# Patient Record
Sex: FEMALE | Race: WHITE | NOT HISPANIC OR LATINO | URBAN - METROPOLITAN AREA
[De-identification: names, ages, dates, MRNs, and addresses within clinical notes are randomized per-mention and may not be internally consistent; named-entity substitution may affect disease eponyms.]

---

## 2023-03-28 ENCOUNTER — TELEPHONE ENCOUNTER (OUTPATIENT)
Dept: URBAN - METROPOLITAN AREA CLINIC 124 | Facility: CLINIC | Age: 58
End: 2023-03-28

## 2023-05-15 ENCOUNTER — LAB OUTSIDE AN ENCOUNTER (OUTPATIENT)
Dept: URBAN - METROPOLITAN AREA CLINIC 96 | Facility: CLINIC | Age: 58
End: 2023-05-15

## 2023-05-15 ENCOUNTER — OFFICE VISIT (OUTPATIENT)
Dept: URBAN - METROPOLITAN AREA CLINIC 96 | Facility: CLINIC | Age: 58
End: 2023-05-15
Payer: COMMERCIAL

## 2023-05-15 VITALS
HEIGHT: 63 IN | TEMPERATURE: 97.7 F | WEIGHT: 164 LBS | BODY MASS INDEX: 29.06 KG/M2 | DIASTOLIC BLOOD PRESSURE: 83 MMHG | SYSTOLIC BLOOD PRESSURE: 132 MMHG

## 2023-05-15 DIAGNOSIS — Z86.010 PERSONAL HISTORY OF COLONIC POLYPS: ICD-10-CM

## 2023-05-15 DIAGNOSIS — K21.00 GASTROESOPHAGEAL REFLUX DISEASE WITH ESOPHAGITIS WITHOUT HEMORRHAGE: ICD-10-CM

## 2023-05-15 DIAGNOSIS — D50.8 OTHER IRON DEFICIENCY ANEMIA: ICD-10-CM

## 2023-05-15 PROBLEM — 87522002: Status: ACTIVE | Noted: 2023-05-15

## 2023-05-15 PROBLEM — 266433003: Status: ACTIVE | Noted: 2023-05-15

## 2023-05-15 PROBLEM — 428283002: Status: ACTIVE | Noted: 2023-05-15

## 2023-05-15 PROCEDURE — 99204 OFFICE O/P NEW MOD 45 MIN: CPT | Performed by: INTERNAL MEDICINE

## 2023-05-15 RX ORDER — PANTOPRAZOLE SODIUM 40 MG/1
1 TABLET TABLET, DELAYED RELEASE ORAL ONCE A DAY
Qty: 90 TABLET | Refills: 3 | OUTPATIENT
Start: 2023-05-15

## 2023-05-15 RX ORDER — SODIUM, POTASSIUM,MAG SULFATES 17.5-3.13G
354 ML SOLUTION, RECONSTITUTED, ORAL ORAL AS DIRECTED
Qty: 1 | Refills: 0 | OUTPATIENT
Start: 2023-05-15 | End: 2023-05-16

## 2023-05-15 NOTE — HPI-TODAY'S VISIT:
Pt had egd and colon in 2/2020 -- 2 polyps in colon (tubular adenom and hyperplastic polyp), irreg z line but no quinn's on path - [facility]  recommended to have egd done next time in hospital secondary to increased secretions no abd pain no nausea or emesis no fevers or chills normal bowel habit is 1 BM q1-2days no BRBPR or melena she has heartburn and indigestion "All the time" she is on Omeprazole 40mg a day - if she misses it she is horrible and sometimes has to take a tums on top of it she takes it in am and symptoms worse at night/bedtime - keeps tums by her bed

## 2023-05-16 ENCOUNTER — OFFICE VISIT (OUTPATIENT)
Dept: URBAN - METROPOLITAN AREA CLINIC 96 | Facility: CLINIC | Age: 58
End: 2023-05-16

## 2023-06-22 ENCOUNTER — OFFICE VISIT (OUTPATIENT)
Dept: URBAN - METROPOLITAN AREA MEDICAL CENTER 28 | Facility: MEDICAL CENTER | Age: 58
End: 2023-06-22
Payer: COMMERCIAL

## 2023-06-22 DIAGNOSIS — Z86.010 ADENOMAS PERSONAL HISTORY OF COLONIC POLYPS: ICD-10-CM

## 2023-06-22 DIAGNOSIS — K31.89 ACQUIRED DEFORMITY OF DUODENUM: ICD-10-CM

## 2023-06-22 DIAGNOSIS — K29.60 ADENOPAPILLOMATOSIS GASTRICA: ICD-10-CM

## 2023-06-22 DIAGNOSIS — K20.80 ESOPHAGITIS DISSECANS SUPERFICIALIS: ICD-10-CM

## 2023-06-22 DIAGNOSIS — Z09 CARDIOLOGY FOLLOW-UP ENCOUNTER: ICD-10-CM

## 2023-06-22 PROCEDURE — 45378 DIAGNOSTIC COLONOSCOPY: CPT | Performed by: INTERNAL MEDICINE

## 2023-06-22 PROCEDURE — 43239 EGD BIOPSY SINGLE/MULTIPLE: CPT | Performed by: INTERNAL MEDICINE

## 2023-06-22 RX ORDER — PANTOPRAZOLE SODIUM 40 MG/1
1 TABLET TABLET, DELAYED RELEASE ORAL ONCE A DAY
Qty: 90 TABLET | Refills: 3 | Status: ACTIVE | COMMUNITY
Start: 2023-05-15

## 2023-07-11 ENCOUNTER — LAB OUTSIDE AN ENCOUNTER (OUTPATIENT)
Dept: URBAN - METROPOLITAN AREA TELEHEALTH 2 | Facility: TELEHEALTH | Age: 58
End: 2023-07-11

## 2023-07-11 ENCOUNTER — OFFICE VISIT (OUTPATIENT)
Dept: URBAN - METROPOLITAN AREA TELEHEALTH 2 | Facility: TELEHEALTH | Age: 58
End: 2023-07-11
Payer: COMMERCIAL

## 2023-07-11 VITALS — HEIGHT: 63 IN | WEIGHT: 160 LBS | BODY MASS INDEX: 28.35 KG/M2

## 2023-07-11 DIAGNOSIS — Z86.010 PERSONAL HISTORY OF COLONIC POLYPS: ICD-10-CM

## 2023-07-11 DIAGNOSIS — R12 HEARTBURN: ICD-10-CM

## 2023-07-11 DIAGNOSIS — K21.00 GASTROESOPHAGEAL REFLUX DISEASE WITH ESOPHAGITIS WITHOUT HEMORRHAGE: ICD-10-CM

## 2023-07-11 DIAGNOSIS — D50.9 ANEMIA: ICD-10-CM

## 2023-07-11 PROCEDURE — 99214 OFFICE O/P EST MOD 30 MIN: CPT | Performed by: INTERNAL MEDICINE

## 2023-07-11 PROCEDURE — 99204 OFFICE O/P NEW MOD 45 MIN: CPT | Performed by: INTERNAL MEDICINE

## 2023-07-11 RX ORDER — PANTOPRAZOLE SODIUM 40 MG/1
1 TABLET TABLET, DELAYED RELEASE ORAL ONCE A DAY
Qty: 90 TABLET | Refills: 3 | Status: ON HOLD | COMMUNITY
Start: 2023-05-15

## 2023-07-11 RX ORDER — PANTOPRAZOLE SODIUM 40 MG/1
1 TABLET TABLET, DELAYED RELEASE ORAL ONCE A DAY
Qty: 90 TABLET | Refills: 3 | OUTPATIENT

## 2023-07-11 RX ORDER — OMEPRAZOLE 40 MG/1
1 CAPSULE 30 MINUTES BEFORE MORNING MEAL CAPSULE, DELAYED RELEASE ORAL ONCE A DAY
Status: ACTIVE | COMMUNITY

## 2023-07-11 NOTE — HPI-TODAY'S VISIT:
56 yo fem ref for a GI fu for anemia and a copy will be sent to the ref provider. Pt had egd and colon in 2/2020 -- 2 polyps in colon (tubular adenom and hyperplastic polyp), irreg z line but no quinn's on path - [facility]  recommended to have egd done next time in hospital secondary to increased secretions no abd pain no nausea or emesis no fevers or chills normal bowel habit is 1 BM q1-2days no BRBPR or melena she has heartburn and indigestion "All the time" she is on Omeprazole 40mg a day - if she misses it she is horrible and sometimes has to take a tums on top of it she takes it in am and symptoms worse at night/bedtime - keeps tums by her bed. Patient had both EGD and colonoscopy done on June 22, 2023 at [facility] GI lab.  Colonoscopy showed internal hemorrhoids otherwise normal exam.  Upper endoscopy showed grade a esophagitis, medium size hiatal hernia otherwise normal exam.  Biopsies were sent.  Pathology showed no evidence of celiac, some inflammation antrum but no H. pylori, and normal body and distal esophagus with focal acute and chronic inflammation but no Quinn's. PT is doing fine. She went back on omeprazole 40mg felt, panto was not working. She is doing ok overall. No dysphagia and no regurgitation. Lost a few pounds on pupose. Sister with her- mom has AML so they are at [facility].

## 2023-07-12 ENCOUNTER — TELEPHONE ENCOUNTER (OUTPATIENT)
Dept: URBAN - METROPOLITAN AREA CLINIC 92 | Facility: CLINIC | Age: 58
End: 2023-07-12

## 2023-08-01 ENCOUNTER — OFFICE VISIT (OUTPATIENT)
Dept: URBAN - METROPOLITAN AREA CLINIC 95 | Facility: CLINIC | Age: 58
End: 2023-08-01

## 2023-08-24 ENCOUNTER — OFFICE VISIT (OUTPATIENT)
Dept: URBAN - METROPOLITAN AREA CLINIC 92 | Facility: CLINIC | Age: 58
End: 2023-08-24

## 2023-10-01 ENCOUNTER — WEB ENCOUNTER (OUTPATIENT)
Dept: URBAN - METROPOLITAN AREA CLINIC 96 | Facility: CLINIC | Age: 58
End: 2023-10-01

## 2023-10-26 ENCOUNTER — OFFICE VISIT (OUTPATIENT)
Dept: URBAN - METROPOLITAN AREA CLINIC 95 | Facility: CLINIC | Age: 58
End: 2023-10-26
Payer: COMMERCIAL

## 2023-10-26 DIAGNOSIS — D50.9 ANEMIA: ICD-10-CM

## 2023-10-26 PROCEDURE — 91110 GI TRC IMG INTRAL ESOPH-ILE: CPT | Performed by: INTERNAL MEDICINE

## 2023-11-03 ENCOUNTER — TELEPHONE ENCOUNTER (OUTPATIENT)
Dept: URBAN - METROPOLITAN AREA CLINIC 96 | Facility: CLINIC | Age: 58
End: 2023-11-03

## 2023-11-28 ENCOUNTER — TELEPHONE ENCOUNTER (OUTPATIENT)
Dept: URBAN - METROPOLITAN AREA CLINIC 96 | Facility: CLINIC | Age: 58
End: 2023-11-28

## 2023-12-06 ENCOUNTER — OFFICE VISIT (OUTPATIENT)
Dept: URBAN - METROPOLITAN AREA CLINIC 96 | Facility: CLINIC | Age: 58
End: 2023-12-06

## 2023-12-21 ENCOUNTER — CLAIMS CREATED FROM THE CLAIM WINDOW (OUTPATIENT)
Dept: URBAN - METROPOLITAN AREA TELEHEALTH 2 | Facility: TELEHEALTH | Age: 58
End: 2023-12-21
Payer: COMMERCIAL

## 2023-12-21 ENCOUNTER — DASHBOARD ENCOUNTERS (OUTPATIENT)
Age: 58
End: 2023-12-21

## 2023-12-21 VITALS — HEIGHT: 63 IN | WEIGHT: 160 LBS | BODY MASS INDEX: 28.35 KG/M2

## 2023-12-21 DIAGNOSIS — K20.90 ESOPHAGITIS: ICD-10-CM

## 2023-12-21 DIAGNOSIS — K21.00 GASTROESOPHAGEAL REFLUX DISEASE WITH ESOPHAGITIS WITHOUT HEMORRHAGE: ICD-10-CM

## 2023-12-21 DIAGNOSIS — K44.9 HIATAL HERNIA: ICD-10-CM

## 2023-12-21 DIAGNOSIS — D50.8 ACHLORHYDRIC ANEMIA: ICD-10-CM

## 2023-12-21 DIAGNOSIS — Z86.010 PERSONAL HISTORY OF COLONIC POLYPS: ICD-10-CM

## 2023-12-21 DIAGNOSIS — D50.9 IRON DEFICIENCY ANEMIA, UNSPECIFIED IRON DEFICIENCY ANEMIA TYPE: ICD-10-CM

## 2023-12-21 PROCEDURE — 99204 OFFICE O/P NEW MOD 45 MIN: CPT | Performed by: INTERNAL MEDICINE

## 2023-12-21 RX ORDER — OMEPRAZOLE 40 MG/1
1 CAPSULE 30 MINUTES BEFORE MORNING MEAL CAPSULE, DELAYED RELEASE ORAL ONCE A DAY
COMMUNITY

## 2023-12-21 RX ORDER — VONOPRAZAN FUMARATE 26.72 MG/1
AS DIRECTED TABLET ORAL ONCE A DAY
Qty: 90 TABLETS | Refills: 1 | OUTPATIENT
Start: 2023-12-21

## 2023-12-21 RX ORDER — PANTOPRAZOLE SODIUM 40 MG/1
1 TABLET TABLET, DELAYED RELEASE ORAL ONCE A DAY
Qty: 90 TABLET | Refills: 3 | COMMUNITY

## 2023-12-21 NOTE — HPI-TODAY'S VISIT:
59 yo fem ref for a GI fu for iron def anemia and a copy will be sent to the ref provider. Pt had egd and colon in 2/2020 -- 2 polyps in colon (tubular adenom and hyperplastic polyp), irreg z line but no quinn's on path - [facility] HH  recommended to have egd done next time in hospital secondary to increased secretions no abd pain no nausea or emesis no fevers or chills normal bowel habit is 1 BM q1-2days no BRBPR or melena she has heartburn and indigestion "All the time" she is on Omeprazole 40mg a day - if she misses it she is horrible and sometimes has to take a tums on top of it she takes it in am and symptoms worse at night/bedtime - keeps tums by her bed. Patient had both EGD and colonoscopy done on June 22, 2023 at [facility] GI lab.  Colonoscopy showed internal hemorrhoids otherwise normal exam.  Upper endoscopy showed grade a esophagitis, medium size hiatal hernia otherwise normal exam.  Biopsies were sent.  Pathology showed no evidence of celiac, some inflammation antrum but no H. pylori, and normal body and distal esophagus with focal acute and chronic inflammation but no Quinn's. PT was doing fine. She went back on omeprazole 40mg felt, panto was not working. She was doing ok overall. No dysphagia and no regurgitation.Lost a few pounds on pupose. PillCam study was done on October 26, 2023 and the small bowel.  And revealed no obvious cause of iron deficiency anemia.  Pt saw Dr Petersen of [facility]- no known causes of iron def anemia. Pt had an iron infusoin.  PT is on omeprazole which works for her and working on lifestyle modifications.           Pt has no bowel complaints.

## 2025-02-17 ENCOUNTER — WEB ENCOUNTER (OUTPATIENT)
Dept: URBAN - METROPOLITAN AREA CLINIC 98 | Facility: CLINIC | Age: 60
End: 2025-02-17

## 2025-05-14 ENCOUNTER — LAB OUTSIDE AN ENCOUNTER (OUTPATIENT)
Dept: URBAN - METROPOLITAN AREA CLINIC 96 | Facility: CLINIC | Age: 60
End: 2025-05-14

## 2025-05-14 ENCOUNTER — OFFICE VISIT (OUTPATIENT)
Dept: URBAN - METROPOLITAN AREA CLINIC 96 | Facility: CLINIC | Age: 60
End: 2025-05-14
Payer: COMMERCIAL

## 2025-05-14 DIAGNOSIS — K44.9 HIATAL HERNIA: ICD-10-CM

## 2025-05-14 DIAGNOSIS — K21.00 GASTROESOPHAGEAL REFLUX DISEASE WITH ESOPHAGITIS WITHOUT HEMORRHAGE: ICD-10-CM

## 2025-05-14 DIAGNOSIS — Z86.0101 PERSONAL HISTORY OF ADENOMATOUS AND SERRATED COLON POLYPS: ICD-10-CM

## 2025-05-14 PROCEDURE — 99204 OFFICE O/P NEW MOD 45 MIN: CPT | Performed by: INTERNAL MEDICINE

## 2025-05-14 PROCEDURE — 99214 OFFICE O/P EST MOD 30 MIN: CPT | Performed by: INTERNAL MEDICINE

## 2025-05-14 RX ORDER — PANTOPRAZOLE SODIUM 40 MG/1
1 TABLET TABLET, DELAYED RELEASE ORAL ONCE A DAY
Qty: 90 TABLET | Refills: 3 | Status: ON HOLD | COMMUNITY

## 2025-05-14 RX ORDER — OMEPRAZOLE 40 MG/1
1 CAPSULE 30 MINUTES BEFORE MORNING MEAL CAPSULE, DELAYED RELEASE ORAL ONCE A DAY
Status: ACTIVE | COMMUNITY

## 2025-05-14 RX ORDER — VONOPRAZAN FUMARATE 26.72 MG/1
AS DIRECTED TABLET ORAL ONCE A DAY
Qty: 90 TABLETS | Refills: 1 | Status: ON HOLD | COMMUNITY
Start: 2023-12-21

## 2025-05-14 RX ORDER — VONOPRAZAN FUMARATE 26.72 MG/1
1 TABLET TABLET ORAL ONCE A DAY
Qty: 90 TABLET | Refills: 3 | OUTPATIENT
Start: 2025-05-14 | End: 2026-05-09

## 2025-05-14 NOTE — HPI-TODAY'S VISIT:
60 yo fem ref by Dr Pereyra for a GI fu and a copy will be sent to the ref provider. Prior visit- Pt had egd and colon in 2/2020 -- 2 polyps in colon (tubular adenom and hyperplastic polyp), irreg z line but no quinn's on path - [facility] sized HH  recommended to have egd done next time in hospital secondary to increased secretions no abd pain no nausea or emesis no fevers or chills normal bowel habit is 1 BM q1-2days no BRBPR or melena she has heartburn and indigestion "All the time" she is on Omeprazole 40mg a day - if she misses it she is horrible and sometimes has to take a tums on top of it she takes it in am and symptoms worse at night/bedtime - keeps tums by her bed. Patient had both EGD and colonoscopy done on June 22, 2023 at [facility] GI lab.  Colonoscopy showed internal hemorrhoids otherwise normal exam.  Upper endoscopy showed grade a esophagitis, medium size hiatal hernia otherwise normal exam.  Biopsies were sent.  Pathology showed no evidence of celiac, some inflammation antrum but no H. pylori, and normal body and distal esophagus with focal acute and chronic inflammation but no Quinn's. PT was doing fine. She went back on omeprazole 40mg felt, panto was not working. She was doing ok overall. No dysphagia and no regurgitation.Lost a few pounds on pupose. PillCam study was done on October 26, 2023 and the small bowel.  And revealed no obvious cause of iron deficiency anemia.  Pt saw Dr Petersen [facility]- no known causes of iron def anemia. Pt had an iron infusoin.  PT is on omeprazole which works for her and working on lifestyle modifications.           Pt has no bowel complaints.  Today's visit- Pt was seen in 2023 for hx of iron def anemia and hx of gerd with HH. Pt had tried various PPIs and I ord voquezna. Pt had iron infusion-2 yrs ago. Pt has borderline anemia. Pt is not taking iron.  Pt is taking omeprazole 40mg bid to control her reflux. She has cp/heartburn and regurgitation. Pt did see a surgeon but she decided to hold off back then. 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

## 2025-05-21 ENCOUNTER — OFFICE VISIT (OUTPATIENT)
Dept: URBAN - METROPOLITAN AREA CLINIC 96 | Facility: CLINIC | Age: 60
End: 2025-05-21

## 2025-06-10 ENCOUNTER — WEB ENCOUNTER (OUTPATIENT)
Dept: URBAN - METROPOLITAN AREA CLINIC 96 | Facility: CLINIC | Age: 60
End: 2025-06-10

## 2025-06-11 ENCOUNTER — TELEPHONE ENCOUNTER (OUTPATIENT)
Dept: URBAN - METROPOLITAN AREA CLINIC 96 | Facility: CLINIC | Age: 60
End: 2025-06-11

## 2025-06-12 ENCOUNTER — TELEPHONE ENCOUNTER (OUTPATIENT)
Dept: URBAN - METROPOLITAN AREA CLINIC 96 | Facility: CLINIC | Age: 60
End: 2025-06-12

## 2025-06-26 ENCOUNTER — OFFICE VISIT (OUTPATIENT)
Dept: URBAN - METROPOLITAN AREA MEDICAL CENTER 28 | Facility: MEDICAL CENTER | Age: 60
End: 2025-06-26

## 2025-07-07 ENCOUNTER — WEB ENCOUNTER (OUTPATIENT)
Dept: URBAN - METROPOLITAN AREA CLINIC 98 | Facility: CLINIC | Age: 60
End: 2025-07-07

## 2025-07-07 RX ORDER — VONOPRAZAN FUMARATE 26.72 MG/1
1 TABLET TABLET ORAL ONCE A DAY
Qty: 90 TABLET | Refills: 3 | OUTPATIENT
Start: 2025-07-08 | End: 2026-07-03

## 2025-08-19 ENCOUNTER — WEB ENCOUNTER (OUTPATIENT)
Dept: URBAN - METROPOLITAN AREA CLINIC 96 | Facility: CLINIC | Age: 60
End: 2025-08-19

## 2025-08-19 RX ORDER — VONOPRAZAN FUMARATE 26.72 MG/1
1 TABLET TABLET ORAL ONCE A DAY
Qty: 90 TABLET | Refills: 3 | OUTPATIENT
Start: 2025-08-21 | End: 2026-08-16